# Patient Record
Sex: MALE | Race: WHITE | NOT HISPANIC OR LATINO | Employment: OTHER | URBAN - NONMETROPOLITAN AREA
[De-identification: names, ages, dates, MRNs, and addresses within clinical notes are randomized per-mention and may not be internally consistent; named-entity substitution may affect disease eponyms.]

---

## 2023-02-09 PROBLEM — R03.0 ELEVATED BLOOD PRESSURE READING: Status: ACTIVE | Noted: 2023-02-09

## 2023-02-09 PROBLEM — F17.210 CIGARETTE NICOTINE DEPENDENCE WITHOUT COMPLICATION: Status: ACTIVE | Noted: 2023-02-09

## 2023-02-09 PROBLEM — J20.9 ACUTE BRONCHITIS: Status: ACTIVE | Noted: 2023-02-09

## 2023-02-09 PROBLEM — L02.01 ABSCESS, CHEEK: Status: ACTIVE | Noted: 2023-02-09

## 2023-02-09 PROBLEM — J45.40 MODERATE PERSISTENT ASTHMA WITHOUT COMPLICATION (HHS-HCC): Status: ACTIVE | Noted: 2023-02-09

## 2023-02-09 PROBLEM — R79.9 ABNORMAL BLOOD CHEMISTRY: Status: ACTIVE | Noted: 2023-02-09

## 2023-02-09 PROBLEM — E78.5 HYPERLIPIDEMIA: Status: ACTIVE | Noted: 2023-02-09

## 2023-02-09 PROBLEM — N13.9: Status: ACTIVE | Noted: 2023-02-09

## 2023-02-09 PROBLEM — R03.0 ELEVATED BLOOD PRESSURE READING WITHOUT DIAGNOSIS OF HYPERTENSION: Status: ACTIVE | Noted: 2023-02-09

## 2023-02-09 PROBLEM — R21 SKIN RASH: Status: ACTIVE | Noted: 2023-02-09

## 2023-02-09 PROBLEM — H61.20 CERUMEN IMPACTION: Status: ACTIVE | Noted: 2023-02-09

## 2023-02-09 PROBLEM — L03.211 DIFFUSE CELLULITIS OF FACE: Status: ACTIVE | Noted: 2023-02-09

## 2023-02-09 PROBLEM — J45.909 ASTHMA (HHS-HCC): Status: ACTIVE | Noted: 2023-02-09

## 2023-02-09 PROBLEM — I10 HTN (HYPERTENSION): Status: ACTIVE | Noted: 2023-02-09

## 2023-02-09 RX ORDER — MONTELUKAST SODIUM 10 MG/1
10 TABLET ORAL DAILY
COMMUNITY
Start: 2019-07-29 | End: 2023-06-22

## 2023-02-09 RX ORDER — ATORVASTATIN CALCIUM 40 MG/1
40 TABLET, FILM COATED ORAL DAILY
COMMUNITY
Start: 2019-07-30 | End: 2023-10-05

## 2023-02-09 RX ORDER — LOSARTAN POTASSIUM 25 MG/1
25 TABLET ORAL DAILY
COMMUNITY
Start: 2021-09-13 | End: 2023-06-22

## 2023-02-09 RX ORDER — BUDESONIDE AND FORMOTEROL FUMARATE DIHYDRATE 160; 4.5 UG/1; UG/1
2 AEROSOL RESPIRATORY (INHALATION) 2 TIMES DAILY
COMMUNITY
Start: 2019-07-29

## 2023-02-09 RX ORDER — LATANOPROST 50 UG/ML
1 SOLUTION/ DROPS OPHTHALMIC NIGHTLY
COMMUNITY
Start: 2021-09-03

## 2023-02-09 RX ORDER — ALBUTEROL SULFATE 90 UG/1
1 AEROSOL, METERED RESPIRATORY (INHALATION) EVERY 4 HOURS PRN
COMMUNITY
Start: 2018-10-30

## 2023-03-22 NOTE — PROGRESS NOTES
"Subjective   Patient ID: Sami Greenwood is a 58 y.o. male who presents for Follow-up.  HPI  A 58 year White M presenting for followup:    He is feeling sleepy, he has been sleeping during the day and staying up at night.      B/L ear congestion: Ears have been feeling good recently. He has good days and bad days.      HLD: On atorvastatin, no SE's.     Asthma: He uses the asmanex, with the ventolin as needed. Needing it 3 or 4 times per week, staying in a lot which helps with the breathing.     HTN: On losartan, no SE's.      Sami is 7th out of 13 kids. His sister who comes is the 13th. She says that her mom got an IUD placed incorrectly and she got pregnant with twins, the girl twin recently had a stroke and Isabella is taking full time care of her right now.      All other systems have been reviewed and are negative for complaint     Review of Systems    Objective   Physical Exam  Gen: No acute distress. Alert and oriented x3.   HEENT: Normocephalic, atraumatic. PERRLA and EOMI, no conjunctival injection. B/L EAC are clear, TM's viewed are WNL. No rhinorrhea, no oropharyngeal lesions.  Neck: No lymphadenopathy, thyroid WNL.  CV: Regular rate and rhythm. Normal S1/S2.  Resp: CTAB/L. No wheezes or rhonchi appreciated.  Abdomen: Soft. Nontender. Nondistended. Bowel sounds normoactive. No guarding or rigidity.  Derm: Skin is warm and dry. No rashes appreciated or suspicious lesions noted.   Neuro: Cranial nerves intact. Normal gait.  Psych: Appropriate mood and affect. Normal speech and eye contact.   Extremities: No deformities appreciated. No severe edema.    Assessment/Plan        57yo male here for a followup visit:     #HTN:  Controlled on losartan     #HLD:  on atorvastatin, no SE\"s.      #Asthma:  Controlled on albuterol prn and symbicort  Rx sent singulair, he can use seasonally     #Elevated glucose:  Monitoring     HCM:  FIT test 2019 normal, next due 2022   Declining flu shot, COVID shot   "

## 2023-03-23 ENCOUNTER — OFFICE VISIT (OUTPATIENT)
Dept: PRIMARY CARE | Facility: CLINIC | Age: 58
End: 2023-03-23
Payer: MEDICARE

## 2023-03-23 VITALS
WEIGHT: 180 LBS | DIASTOLIC BLOOD PRESSURE: 61 MMHG | HEIGHT: 71 IN | HEART RATE: 69 BPM | BODY MASS INDEX: 25.2 KG/M2 | SYSTOLIC BLOOD PRESSURE: 121 MMHG

## 2023-03-23 DIAGNOSIS — F51.01 PRIMARY INSOMNIA: ICD-10-CM

## 2023-03-23 DIAGNOSIS — Z13.220 SCREENING FOR HYPERLIPIDEMIA: ICD-10-CM

## 2023-03-23 DIAGNOSIS — Z12.11 ENCOUNTER FOR SCREENING FOR MALIGNANT NEOPLASM OF COLON: ICD-10-CM

## 2023-03-23 DIAGNOSIS — Z00.00 HEALTHCARE MAINTENANCE: Primary | ICD-10-CM

## 2023-03-23 PROCEDURE — 99214 OFFICE O/P EST MOD 30 MIN: CPT | Performed by: FAMILY MEDICINE

## 2023-03-23 PROCEDURE — 3078F DIAST BP <80 MM HG: CPT | Performed by: FAMILY MEDICINE

## 2023-03-23 PROCEDURE — 3074F SYST BP LT 130 MM HG: CPT | Performed by: FAMILY MEDICINE

## 2023-04-07 LAB — NONINV COLON CA DNA+OCC BLD SCRN STL QL: POSITIVE

## 2023-04-17 ENCOUNTER — TELEPHONE (OUTPATIENT)
Dept: PRIMARY CARE | Facility: CLINIC | Age: 58
End: 2023-04-17
Payer: MEDICARE

## 2023-04-17 NOTE — TELEPHONE ENCOUNTER
Sami's wife called, she is wondering if we can send referral for a colonscopy? After Sami took some time to think about it he would like to have one done. They are wondering if there is anything else he needs to do before hand?

## 2023-04-18 DIAGNOSIS — Z12.11 ENCOUNTER FOR SCREENING FOR MALIGNANT NEOPLASM OF COLON: Primary | ICD-10-CM

## 2023-04-18 NOTE — TELEPHONE ENCOUNTER
Okay! I would recommend Dr. Bell for him please. I'll put the referral in for them. There is not anything really that needs to be done prior to the procedure, Dr. Bell is great and he will go over everything with them.

## 2023-06-22 DIAGNOSIS — I10 ESSENTIAL (PRIMARY) HYPERTENSION: ICD-10-CM

## 2023-06-22 DIAGNOSIS — J45.909 UNSPECIFIED ASTHMA, UNCOMPLICATED (HHS-HCC): ICD-10-CM

## 2023-06-22 RX ORDER — MONTELUKAST SODIUM 10 MG/1
TABLET ORAL
Qty: 90 TABLET | Refills: 1 | Status: SHIPPED | OUTPATIENT
Start: 2023-06-22 | End: 2023-10-30

## 2023-06-22 RX ORDER — LOSARTAN POTASSIUM 25 MG/1
TABLET ORAL
Qty: 90 TABLET | Refills: 1 | Status: SHIPPED | OUTPATIENT
Start: 2023-06-22 | End: 2023-10-26

## 2023-09-18 ENCOUNTER — LAB (OUTPATIENT)
Dept: LAB | Facility: LAB | Age: 58
End: 2023-09-18
Payer: MEDICARE

## 2023-09-18 DIAGNOSIS — F51.01 PRIMARY INSOMNIA: ICD-10-CM

## 2023-09-18 DIAGNOSIS — Z00.00 HEALTHCARE MAINTENANCE: ICD-10-CM

## 2023-09-18 LAB
ALANINE AMINOTRANSFERASE (SGPT) (U/L) IN SER/PLAS: 24 U/L (ref 10–52)
ALBUMIN (G/DL) IN SER/PLAS: 4.1 G/DL (ref 3.4–5)
ALKALINE PHOSPHATASE (U/L) IN SER/PLAS: 95 U/L (ref 33–120)
ANION GAP IN SER/PLAS: 11 MMOL/L (ref 10–20)
ASPARTATE AMINOTRANSFERASE (SGOT) (U/L) IN SER/PLAS: 13 U/L (ref 9–39)
BASOPHILS (10*3/UL) IN BLOOD BY AUTOMATED COUNT: 0.09 X10E9/L (ref 0–0.1)
BASOPHILS/100 LEUKOCYTES IN BLOOD BY AUTOMATED COUNT: 1.1 % (ref 0–2)
BILIRUBIN TOTAL (MG/DL) IN SER/PLAS: 0.7 MG/DL (ref 0–1.2)
CALCIUM (MG/DL) IN SER/PLAS: 9.4 MG/DL (ref 8.6–10.3)
CARBON DIOXIDE, TOTAL (MMOL/L) IN SER/PLAS: 30 MMOL/L (ref 21–32)
CHLORIDE (MMOL/L) IN SER/PLAS: 103 MMOL/L (ref 98–107)
CREATININE (MG/DL) IN SER/PLAS: 1.08 MG/DL (ref 0.5–1.3)
EOSINOPHILS (10*3/UL) IN BLOOD BY AUTOMATED COUNT: 0.46 X10E9/L (ref 0–0.7)
EOSINOPHILS/100 LEUKOCYTES IN BLOOD BY AUTOMATED COUNT: 5.5 % (ref 0–6)
ERYTHROCYTE DISTRIBUTION WIDTH (RATIO) BY AUTOMATED COUNT: 12.6 % (ref 11.5–14.5)
ERYTHROCYTE MEAN CORPUSCULAR HEMOGLOBIN CONCENTRATION (G/DL) BY AUTOMATED: 32.3 G/DL (ref 32–36)
ERYTHROCYTE MEAN CORPUSCULAR VOLUME (FL) BY AUTOMATED COUNT: 98 FL (ref 80–100)
ERYTHROCYTES (10*6/UL) IN BLOOD BY AUTOMATED COUNT: 4.94 X10E12/L (ref 4.5–5.9)
GFR MALE: 79 ML/MIN/1.73M2
GLUCOSE (MG/DL) IN SER/PLAS: 90 MG/DL (ref 74–99)
HEMATOCRIT (%) IN BLOOD BY AUTOMATED COUNT: 48.3 % (ref 41–52)
HEMOGLOBIN (G/DL) IN BLOOD: 15.6 G/DL (ref 13.5–17.5)
IMMATURE GRANULOCYTES/100 LEUKOCYTES IN BLOOD BY AUTOMATED COUNT: 0.1 % (ref 0–0.9)
LEUKOCYTES (10*3/UL) IN BLOOD BY AUTOMATED COUNT: 8.3 X10E9/L (ref 4.4–11.3)
LYMPHOCYTES (10*3/UL) IN BLOOD BY AUTOMATED COUNT: 2.92 X10E9/L (ref 1.2–4.8)
LYMPHOCYTES/100 LEUKOCYTES IN BLOOD BY AUTOMATED COUNT: 35.1 % (ref 13–44)
MONOCYTES (10*3/UL) IN BLOOD BY AUTOMATED COUNT: 0.65 X10E9/L (ref 0.1–1)
MONOCYTES/100 LEUKOCYTES IN BLOOD BY AUTOMATED COUNT: 7.8 % (ref 2–10)
NEUTROPHILS (10*3/UL) IN BLOOD BY AUTOMATED COUNT: 4.18 X10E9/L (ref 1.2–7.7)
NEUTROPHILS/100 LEUKOCYTES IN BLOOD BY AUTOMATED COUNT: 50.4 % (ref 40–80)
PLATELETS (10*3/UL) IN BLOOD AUTOMATED COUNT: 329 X10E9/L (ref 150–450)
POTASSIUM (MMOL/L) IN SER/PLAS: 4.6 MMOL/L (ref 3.5–5.3)
PROTEIN TOTAL: 6.3 G/DL (ref 6.4–8.2)
SODIUM (MMOL/L) IN SER/PLAS: 139 MMOL/L (ref 136–145)
UREA NITROGEN (MG/DL) IN SER/PLAS: 14 MG/DL (ref 6–23)

## 2023-09-18 PROCEDURE — 36415 COLL VENOUS BLD VENIPUNCTURE: CPT

## 2023-09-18 PROCEDURE — 80053 COMPREHEN METABOLIC PANEL: CPT

## 2023-09-18 PROCEDURE — 85025 COMPLETE CBC W/AUTO DIFF WBC: CPT

## 2023-09-19 ENCOUNTER — LAB (OUTPATIENT)
Dept: LAB | Facility: LAB | Age: 58
End: 2023-09-19
Payer: MEDICARE

## 2023-09-19 DIAGNOSIS — Z13.220 SCREENING FOR HYPERLIPIDEMIA: ICD-10-CM

## 2023-09-19 LAB
CHOLESTEROL (MG/DL) IN SER/PLAS: 125 MG/DL (ref 0–199)
CHOLESTEROL IN HDL (MG/DL) IN SER/PLAS: 30.1 MG/DL
CHOLESTEROL/HDL RATIO: 4.2
LDL: 80 MG/DL (ref 0–99)
TRIGLYCERIDE (MG/DL) IN SER/PLAS: 77 MG/DL (ref 0–149)
VLDL: 15 MG/DL (ref 0–40)

## 2023-09-19 PROCEDURE — 80061 LIPID PANEL: CPT

## 2023-09-19 PROCEDURE — 36415 COLL VENOUS BLD VENIPUNCTURE: CPT

## 2023-09-20 NOTE — PROGRESS NOTES
"Sami Greenwood is a 58 y.o. male who presents for Medicare Annual Wellness Visit Subsequent (6 months)     B/L ear congestion: Ears have been feeling good recently. He has good days and bad days.      HLD: On atorvastatin, no SE's.     Asthma: He uses the asmanex, with the ventolin as needed. Needing it 3 or 4 times per week, staying in a lot which helps with the breathing.     HTN: On losartan, no SE's.      Sami is 7th out of 13 kids. His sister who comes is the 13th. She says that her mom got an IUD placed incorrectly and she got pregnant with twins, the girl twin recently had a stroke and Isabella is taking full time care of her right now.      All other systems have been reviewed and are negative for complaint     Objective   /70 (BP Location: Left arm, Patient Position: Sitting, BP Cuff Size: Adult)   Pulse 69   Ht 1.803 m (5' 11\")   Wt 78.9 kg (174 lb)   BMI 24.27 kg/m²     Gen: No acute distress, alert and oriented x3, pleasant   HEENT: moist mucous membranes, b/l external auditory canals are clear of debris, TMs within normal limits, no oropharyngeal lesions, eomi, perrla   Neck: thyroid within normal limits, no lymphadenopathy   CV: RRR, normal S1/S2, no murmur   Resp: Clear to auscultation bilaterally, no wheezes or rhonchi appreciated  Abd: soft, nontender, non-distended, no guarding/rigidity, bowel sounds present  Extr: no edema, no calf tenderness  Derm: Skin is warm and dry, no rashes appreciated  Psych: mood is good, affect is congruent, good hygiene, normal speech and eye contact  Neuro: cranial nerves grossly intact, normal gait    Assessment/Plan       #HTN:  Controlled on losartan     #HLD:  on atorvastatin, no SE\"s.      #Asthma:  Controlled on albuterol prn and symbicort  Rx sent singulair, he can use seasonally     #Elevated glucose:  Diet controlled  Monitoring     HCM:  Cologuard positive march, scheduled for C-scope in october  Declining flu shot, COVID shot   "

## 2023-09-28 ENCOUNTER — OFFICE VISIT (OUTPATIENT)
Dept: PRIMARY CARE | Facility: CLINIC | Age: 58
End: 2023-09-28
Payer: MEDICARE

## 2023-09-28 VITALS
WEIGHT: 174 LBS | BODY MASS INDEX: 24.36 KG/M2 | HEIGHT: 71 IN | HEART RATE: 69 BPM | DIASTOLIC BLOOD PRESSURE: 70 MMHG | SYSTOLIC BLOOD PRESSURE: 115 MMHG

## 2023-09-28 DIAGNOSIS — E78.5 HYPERLIPIDEMIA, UNSPECIFIED HYPERLIPIDEMIA TYPE: Primary | ICD-10-CM

## 2023-09-28 PROBLEM — N13.9: Status: RESOLVED | Noted: 2023-02-09 | Resolved: 2023-09-28

## 2023-09-28 PROBLEM — R79.9 ABNORMAL BLOOD CHEMISTRY: Status: RESOLVED | Noted: 2023-02-09 | Resolved: 2023-09-28

## 2023-09-28 PROBLEM — L03.211 DIFFUSE CELLULITIS OF FACE: Status: RESOLVED | Noted: 2023-02-09 | Resolved: 2023-09-28

## 2023-09-28 PROBLEM — L71.9 ROSACEA, UNSPECIFIED: Status: ACTIVE | Noted: 2017-12-22

## 2023-09-28 PROBLEM — L02.01 ABSCESS, CHEEK: Status: RESOLVED | Noted: 2023-02-09 | Resolved: 2023-09-28

## 2023-09-28 PROBLEM — J20.9 ACUTE BRONCHITIS: Status: RESOLVED | Noted: 2023-02-09 | Resolved: 2023-09-28

## 2023-09-28 PROBLEM — R21 SKIN RASH: Status: RESOLVED | Noted: 2023-02-09 | Resolved: 2023-09-28

## 2023-09-28 PROBLEM — H40.033 ANATOMICAL NARROW ANGLE, BILATERAL: Status: ACTIVE | Noted: 2017-05-04

## 2023-09-28 PROBLEM — H61.20 CERUMEN IMPACTION: Status: RESOLVED | Noted: 2023-02-09 | Resolved: 2023-09-28

## 2023-09-28 PROCEDURE — G0439 PPPS, SUBSEQ VISIT: HCPCS | Performed by: FAMILY MEDICINE

## 2023-09-28 PROCEDURE — 3078F DIAST BP <80 MM HG: CPT | Performed by: FAMILY MEDICINE

## 2023-09-28 PROCEDURE — 3074F SYST BP LT 130 MM HG: CPT | Performed by: FAMILY MEDICINE

## 2023-09-28 ASSESSMENT — ACTIVITIES OF DAILY LIVING (ADL)
DRESSING: INDEPENDENT
DOING_HOUSEWORK: NEEDS ASSISTANCE
TAKING_MEDICATION: NEEDS ASSISTANCE
BATHING: INDEPENDENT
MANAGING_FINANCES: NEEDS ASSISTANCE
GROCERY_SHOPPING: NEEDS ASSISTANCE

## 2023-09-28 ASSESSMENT — PATIENT HEALTH QUESTIONNAIRE - PHQ9
2. FEELING DOWN, DEPRESSED OR HOPELESS: NOT AT ALL
SUM OF ALL RESPONSES TO PHQ9 QUESTIONS 1 AND 2: 0
1. LITTLE INTEREST OR PLEASURE IN DOING THINGS: NOT AT ALL

## 2023-10-05 DIAGNOSIS — E78.5 HYPERLIPIDEMIA, UNSPECIFIED: ICD-10-CM

## 2023-10-05 RX ORDER — ATORVASTATIN CALCIUM 40 MG/1
40 TABLET, FILM COATED ORAL DAILY
Qty: 90 TABLET | Refills: 3 | Status: SHIPPED | OUTPATIENT
Start: 2023-10-05

## 2023-10-26 DIAGNOSIS — I10 ESSENTIAL (PRIMARY) HYPERTENSION: ICD-10-CM

## 2023-10-26 RX ORDER — LOSARTAN POTASSIUM 25 MG/1
TABLET ORAL
Qty: 90 TABLET | Refills: 1 | Status: SHIPPED | OUTPATIENT
Start: 2023-10-26 | End: 2024-04-26 | Stop reason: SDUPTHER

## 2023-10-30 DIAGNOSIS — J45.909 UNSPECIFIED ASTHMA, UNCOMPLICATED (HHS-HCC): ICD-10-CM

## 2023-10-30 RX ORDER — MONTELUKAST SODIUM 10 MG/1
TABLET ORAL
Qty: 90 TABLET | Refills: 1 | Status: SHIPPED | OUTPATIENT
Start: 2023-10-30 | End: 2024-04-26 | Stop reason: SDUPTHER

## 2023-12-18 ENCOUNTER — OFFICE VISIT (OUTPATIENT)
Dept: PRIMARY CARE | Facility: CLINIC | Age: 58
End: 2023-12-18
Payer: MEDICARE

## 2023-12-18 VITALS
WEIGHT: 171 LBS | HEIGHT: 71 IN | DIASTOLIC BLOOD PRESSURE: 70 MMHG | SYSTOLIC BLOOD PRESSURE: 123 MMHG | TEMPERATURE: 98.5 F | BODY MASS INDEX: 23.94 KG/M2 | HEART RATE: 68 BPM

## 2023-12-18 DIAGNOSIS — R05.1 ACUTE COUGH: ICD-10-CM

## 2023-12-18 LAB
POC RAPID INFLUENZA A: NEGATIVE
POC RAPID INFLUENZA B: NEGATIVE
POC SARS-COV-2 AG BINAX: NORMAL

## 2023-12-18 PROCEDURE — 3078F DIAST BP <80 MM HG: CPT | Performed by: FAMILY MEDICINE

## 2023-12-18 PROCEDURE — 99214 OFFICE O/P EST MOD 30 MIN: CPT | Performed by: FAMILY MEDICINE

## 2023-12-18 PROCEDURE — 87811 SARS-COV-2 COVID19 W/OPTIC: CPT | Performed by: FAMILY MEDICINE

## 2023-12-18 PROCEDURE — 3074F SYST BP LT 130 MM HG: CPT | Performed by: FAMILY MEDICINE

## 2023-12-18 PROCEDURE — 87804 INFLUENZA ASSAY W/OPTIC: CPT | Performed by: FAMILY MEDICINE

## 2023-12-18 RX ORDER — SOD SULF/POT CHLORIDE/MAG SULF 1.479 G
TABLET ORAL
COMMUNITY
Start: 2023-11-01

## 2023-12-18 RX ORDER — PREDNISONE 20 MG/1
40 TABLET ORAL DAILY
Qty: 10 TABLET | Refills: 0 | Status: SHIPPED | OUTPATIENT
Start: 2023-12-18 | End: 2023-12-23

## 2023-12-18 RX ORDER — AZITHROMYCIN 250 MG/1
TABLET, FILM COATED ORAL
Qty: 6 TABLET | Refills: 0 | Status: SHIPPED | OUTPATIENT
Start: 2023-12-18 | End: 2023-12-23

## 2023-12-18 NOTE — PROGRESS NOTES
"Sami Greenwood is a 58 y.o. male who presents for Sick Visit (Cough for about a week, taking Dimetapp which is helping a little)    Cough, started about a week ago. Non-productive. No sore throat or rhinorrhea. Ears are a bit muffled. No vomiting or diarrhea. No sinus pressure.     B/L ear congestion: Ears have been feeling good recently. He has good days and bad days.      HLD: On atorvastatin, no SE's.     Asthma: He uses the asmanex, with the ventolin as needed. Needing it 3 or 4 times per week, staying in a lot which helps with the breathing.     HTN: On losartan, no SE's.      Sami is 7th out of 13 kids. His sister who comes is the 13th. She says that her mom got an IUD placed incorrectly and she got pregnant with twins, the girl twin recently had a stroke and Isabella is taking full time care of her right now.      All other systems have been reviewed and are negative for complaint     Objective   /70 (BP Location: Left arm, Patient Position: Sitting, BP Cuff Size: Adult)   Pulse 68   Temp 36.9 °C (98.5 °F)   Ht 1.803 m (5' 11\")   Wt 77.6 kg (171 lb)   BMI 23.85 kg/m²     Gen: No acute distress, alert and oriented x3, pleasant   HEENT: moist mucous membranes, b/l external auditory canals are clear of debris, TMs within normal limits, no oropharyngeal lesions, eomi, perrla   Neck: thyroid within normal limits, no lymphadenopathy   CV: RRR, normal S1/S2, no murmur   Resp: Clear to auscultation bilaterally, no wheezes or rhonchi appreciated  Abd: soft, nontender, non-distended, no guarding/rigidity, bowel sounds present  Extr: no edema, no calf tenderness  Derm: Skin is warm and dry, no rashes appreciated  Psych: mood is good, affect is congruent, good hygiene, normal speech and eye contact  Neuro: cranial nerves grossly intact, normal gait    Assessment/Plan     #Acute bronchitis  Neg flu and COVID rapid in office  Rx sent azithromycin  Rx sent prednisone burst  Fluids, rest, tylenol " "prn    #HTN:  Controlled on losartan     #HLD:  on atorvastatin, no SE\"s.      #Asthma:  Controlled on albuterol prn and symbicort  Rx sent singulair, he can use seasonally     #Elevated glucose:  Diet controlled  Monitoring     HCM:  Cologjuan positive march, scheduled for C-scope in october  Declining flu shot, COVID shot   "

## 2024-04-26 DIAGNOSIS — J45.909 UNSPECIFIED ASTHMA, UNCOMPLICATED (HHS-HCC): ICD-10-CM

## 2024-04-26 DIAGNOSIS — I10 ESSENTIAL (PRIMARY) HYPERTENSION: ICD-10-CM

## 2024-04-26 RX ORDER — LOSARTAN POTASSIUM 25 MG/1
25 TABLET ORAL DAILY
Qty: 90 TABLET | Refills: 3 | Status: SHIPPED | OUTPATIENT
Start: 2024-04-26

## 2024-04-26 RX ORDER — MONTELUKAST SODIUM 10 MG/1
10 TABLET ORAL DAILY
Qty: 90 TABLET | Refills: 3 | Status: SHIPPED | OUTPATIENT
Start: 2024-04-26

## 2024-06-24 NOTE — PROGRESS NOTES
"Subjective   Patient ID: Sami Greenwood is a 59 y.o. male who presents for Medicare Annual Wellness Visit Subsequent (Follow up, no concerns).     B/L ear congestion: Ears have been feeling good recently. He has good days and bad days.      HLD: On atorvastatin, no SE's.     Asthma: He uses the asmanex, with the ventolin as needed. Needing it 3 or 4 times per week, staying in a lot which helps with the breathing.     HTN: On losartan, no SE's.      Sami is 7th out of 13 kids. His sister who comes is the 13th. She says that her mom got an IUD placed incorrectly and she got pregnant with twins, the girl twin recently had a stroke and Isabella is taking full time care of her right now.      All other systems have been reviewed and are negative for complaint     Objective   /65 (BP Location: Left arm, Patient Position: Sitting, BP Cuff Size: Adult)   Pulse 72   Ht 1.803 m (5' 11\")   Wt 74.4 kg (164 lb)   BMI 22.87 kg/m²     Gen: No acute distress, alert and oriented x3, pleasant   HEENT: moist mucous membranes, b/l external auditory canals are clear of debris, TMs within normal limits, no oropharyngeal lesions, eomi, perrla   Neck: thyroid within normal limits, no lymphadenopathy   CV: RRR, normal S1/S2, no murmur   Resp: Clear to auscultation bilaterally, no wheezes or rhonchi appreciated  Abd: soft, nontender, non-distended, no guarding/rigidity, bowel sounds present  Extr: no edema, no calf tenderness  Derm: Skin is warm and dry, no rashes appreciated  Psych: mood is good, affect is congruent, good hygiene, normal speech and eye contact  Neuro: cranial nerves grossly intact, normal gait    Assessment/Plan      #HTN:  Controlled on losartan    #Cerumen impaction  Tolerated curettage  Patient ID: Sami Greenwood is a 59 y.o. male.    Ear Cerumen Removal    Date/Time: 7/3/2024 11:25 AM    Performed by: Alysa Pardo DO  Authorized by: Alysa Pardo DO    Consent:     Consent obtained:  " "Verbal  Procedure details:     Location:  R ear    Procedure type: curette      Procedure outcomes: cerumen removed    Post-procedure details:     Inspection:  TM intact and ear canal clear    Hearing quality:  Improved    Procedure completion:  Tolerated well, no immediate complications       #HLD:  on atorvastatin, no SE\"s.      #Asthma:  Controlled on albuterol prn and symbicort  Rx sent singulair, he can use seasonally     #Elevated glucose:  Diet controlled  Monitoring     HCM:  Ave positive march, scheduled for C-scope in october  Declining flu shot, COVID shot   [facility] AWV 9/28/23  "

## 2024-07-03 ENCOUNTER — APPOINTMENT (OUTPATIENT)
Dept: PRIMARY CARE | Facility: CLINIC | Age: 59
End: 2024-07-03
Payer: MEDICARE

## 2024-07-03 VITALS
BODY MASS INDEX: 22.96 KG/M2 | HEIGHT: 71 IN | DIASTOLIC BLOOD PRESSURE: 78 MMHG | HEART RATE: 72 BPM | WEIGHT: 164 LBS | SYSTOLIC BLOOD PRESSURE: 130 MMHG

## 2024-07-03 DIAGNOSIS — Z00.00 HEALTHCARE MAINTENANCE: Primary | ICD-10-CM

## 2024-07-03 DIAGNOSIS — Z13.220 SCREENING FOR HYPERLIPIDEMIA: ICD-10-CM

## 2024-07-03 DIAGNOSIS — I10 PRIMARY HYPERTENSION: ICD-10-CM

## 2024-07-03 DIAGNOSIS — H61.21 IMPACTED CERUMEN OF RIGHT EAR: ICD-10-CM

## 2024-07-03 PROCEDURE — 3075F SYST BP GE 130 - 139MM HG: CPT | Performed by: FAMILY MEDICINE

## 2024-07-03 PROCEDURE — G0439 PPPS, SUBSEQ VISIT: HCPCS | Performed by: FAMILY MEDICINE

## 2024-07-03 PROCEDURE — 69210 REMOVE IMPACTED EAR WAX UNI: CPT | Performed by: FAMILY MEDICINE

## 2024-07-03 PROCEDURE — 3078F DIAST BP <80 MM HG: CPT | Performed by: FAMILY MEDICINE

## 2024-07-03 ASSESSMENT — ACTIVITIES OF DAILY LIVING (ADL)
DRESSING: INDEPENDENT
DOING_HOUSEWORK: INDEPENDENT
BATHING: INDEPENDENT
GROCERY_SHOPPING: NEEDS ASSISTANCE
TAKING_MEDICATION: NEEDS ASSISTANCE
MANAGING_FINANCES: NEEDS ASSISTANCE

## 2024-07-03 ASSESSMENT — PATIENT HEALTH QUESTIONNAIRE - PHQ9
1. LITTLE INTEREST OR PLEASURE IN DOING THINGS: NOT AT ALL
SUM OF ALL RESPONSES TO PHQ9 QUESTIONS 1 AND 2: 0
2. FEELING DOWN, DEPRESSED OR HOPELESS: NOT AT ALL

## 2024-09-25 DIAGNOSIS — E78.5 HYPERLIPIDEMIA, UNSPECIFIED: ICD-10-CM

## 2024-09-25 RX ORDER — ATORVASTATIN CALCIUM 40 MG/1
40 TABLET, FILM COATED ORAL DAILY
Qty: 90 TABLET | Refills: 3 | Status: SHIPPED | OUTPATIENT
Start: 2024-09-25

## 2024-10-09 NOTE — PROGRESS NOTES
"Subjective     Patient ID: Sami Greenwood is a 59 y.o. male who presents for Sick Visit (Rash, red, round, scabbed from itching, on both arms).    HPI  Sami is here today for a rash on both his arms, wrist, and hands. He was outside doing yard work this past week and developed a rash three days ago. He describes the rash as red, itchy, and bumpy. He has tried benadryl gel and calamine lotion so far and it is not helping.       Current Outpatient Medications:     albuterol 90 mcg/actuation inhaler, Inhale 1 puff every 4 hours if needed (ASTHMA). MAY USE TWO PUFFS IF NEEDED, Disp: , Rfl:     atorvastatin (Lipitor) 40 mg tablet, TAKE 1 TABLET BY MOUTH EVERY DAY, Disp: 90 tablet, Rfl: 3    budesonide-formoteroL (Symbicort) 160-4.5 mcg/actuation inhaler, Inhale 2 puffs 2 times a day. RINSE MOUTH AFTER USE, Disp: , Rfl:     latanoprost (Xalatan) 0.005 % ophthalmic solution, Administer 1 drop into both eyes once daily at bedtime., Disp: , Rfl:     losartan (Cozaar) 25 mg tablet, Take 1 tablet (25 mg) by mouth once daily., Disp: 90 tablet, Rfl: 3    montelukast (Singulair) 10 mg tablet, Take 1 tablet (10 mg) by mouth once daily., Disp: 90 tablet, Rfl: 3    Sutab 1.479-0.188- 0.225 gram tablet, TAKE BY MOUTH AS DIRECTED, Disp: , Rfl:     Review of Systems    /72 (BP Location: Left arm, Patient Position: Sitting, BP Cuff Size: Adult)   Pulse 75   Ht 1.803 m (5' 11\")   Wt 78 kg (172 lb)   SpO2 98%   BMI 23.99 kg/m²      Objective     Physical Exam    Gen: No acute distress, alert and oriented x3, pleasant   Resp: Clear to auscultation bilaterally, no wheezes or rhonchi appreciated  Extr: Rash bilateral upper extremities. no edema, no calf tenderness  Derm: Scaly erythematous papules and plaques with edema and vesiculation in a distribution consistent with contact Bilateral upper extremities.  Psych: mood is good, affect is congruent, good hygiene, normal speech and eye contact    Assessment/Plan   #Contact " Dermatitis  -Poison Ivy  Triamcinolone ointment  Instructed to wash skin after yard work with dial, tigist, or lava soap.   -May use calamine lotion or benadryl gel in between applications of triamcinolone  -Cool water when washing  -Wash clothes he was wearing that day before wearing again.

## 2024-10-10 ENCOUNTER — OFFICE VISIT (OUTPATIENT)
Dept: PRIMARY CARE | Facility: CLINIC | Age: 59
End: 2024-10-10
Payer: MEDICARE

## 2024-10-10 VITALS
DIASTOLIC BLOOD PRESSURE: 72 MMHG | OXYGEN SATURATION: 98 % | HEART RATE: 75 BPM | BODY MASS INDEX: 24.08 KG/M2 | HEIGHT: 71 IN | SYSTOLIC BLOOD PRESSURE: 149 MMHG | WEIGHT: 172 LBS

## 2024-10-10 DIAGNOSIS — L23.7 ALLERGIC CONTACT DERMATITIS DUE TO PLANTS, EXCEPT FOOD: Primary | ICD-10-CM

## 2024-10-10 PROCEDURE — 3077F SYST BP >= 140 MM HG: CPT

## 2024-10-10 PROCEDURE — 3008F BODY MASS INDEX DOCD: CPT

## 2024-10-10 PROCEDURE — 3078F DIAST BP <80 MM HG: CPT

## 2024-10-10 PROCEDURE — 99213 OFFICE O/P EST LOW 20 MIN: CPT

## 2024-10-10 RX ORDER — TRIAMCINOLONE ACETONIDE 1 MG/G
OINTMENT TOPICAL 2 TIMES DAILY PRN
Qty: 15 G | Refills: 0 | Status: SHIPPED | OUTPATIENT
Start: 2024-10-10 | End: 2025-02-07

## 2024-12-30 DIAGNOSIS — E78.5 HYPERLIPIDEMIA, UNSPECIFIED: ICD-10-CM

## 2024-12-30 RX ORDER — ATORVASTATIN CALCIUM 40 MG/1
40 TABLET, FILM COATED ORAL DAILY
Qty: 90 TABLET | Refills: 3 | Status: SHIPPED | OUTPATIENT
Start: 2024-12-30

## 2025-01-08 ENCOUNTER — APPOINTMENT (OUTPATIENT)
Dept: PRIMARY CARE | Facility: CLINIC | Age: 60
End: 2025-01-08
Payer: MEDICARE

## 2025-01-17 ENCOUNTER — APPOINTMENT (OUTPATIENT)
Dept: PRIMARY CARE | Facility: CLINIC | Age: 60
End: 2025-01-17
Payer: MEDICARE

## 2025-02-27 ENCOUNTER — TELEPHONE (OUTPATIENT)
Dept: PRIMARY CARE | Facility: CLINIC | Age: 60
End: 2025-02-27
Payer: MEDICARE

## 2025-02-27 DIAGNOSIS — J20.9 ACUTE BRONCHITIS, UNSPECIFIED ORGANISM: Primary | ICD-10-CM

## 2025-02-27 RX ORDER — AZITHROMYCIN 250 MG/1
TABLET, FILM COATED ORAL
Qty: 6 TABLET | Refills: 0 | Status: SHIPPED | OUTPATIENT
Start: 2025-02-27 | End: 2025-03-04

## 2025-06-06 ENCOUNTER — APPOINTMENT (OUTPATIENT)
Dept: PRIMARY CARE | Facility: CLINIC | Age: 60
End: 2025-06-06
Payer: MEDICARE

## 2025-06-24 NOTE — PROGRESS NOTES
"Subjective   Reason for Visit: Sami Greenwood is an 60 y.o. male here for a Medicare Wellness visit.      Reviewed all medications by prescribing practitioner or clinical pharmacist (such as prescriptions, OTCs, herbal therapies and supplements) and documented in the medical record.    HPI    Cloged ears-States he has to have wax removed about every 6 months. Having issues with hearing.     Asthma- albuterol, singular, symbicort. needs refill on inhaler. Still smoking. Does not plan on quitting.     HTN- Controlled on losartan     HDL-Has not had checked since 2023. Taking atorvastatin no SE's.       Patient Care Team:  Alysa Pardo DO as PCP - General  Alysa Pardo DO as PCP - [employer] PCP     Objective     /64 (BP Location: Right arm, Patient Position: Sitting, BP Cuff Size: Adult)   Pulse 76   Ht 1.803 m (5' 11\")   Wt 74.8 kg (165 lb)   BMI 23.01 kg/m²       Physical Exam  Vitals reviewed.   HENT:      Right Ear: There is impacted cerumen.      Left Ear: Tympanic membrane and ear canal normal.      Nose: Nose normal.      Mouth/Throat:      Mouth: Mucous membranes are moist.   Eyes:      Pupils: Pupils are equal, round, and reactive to light.   Cardiovascular:      Rate and Rhythm: Normal rate.      Heart sounds: Normal heart sounds.   Pulmonary:      Effort: Pulmonary effort is normal.      Breath sounds: Normal breath sounds.   Abdominal:      General: Bowel sounds are normal.   Musculoskeletal:         General: Normal range of motion.      Cervical back: Normal range of motion and neck supple.   Skin:     General: Skin is warm.      Capillary Refill: Capillary refill takes less than 2 seconds.   Neurological:      General: No focal deficit present.      Mental Status: He is alert and oriented to person, place, and time.   Psychiatric:         Mood and Affect: Mood normal.       Assessment & Plan       #HTN:  Controlled on losartan     #Cerumen impaction  Tolerated " "curettage    #HLD:  on atorvastatin, no SE\"s.   Lipid panel ordered     #Asthma:  Controlled on albuterol prn and symbicort     #Elevated glucose:  Diet controlled  Monitoring    HCM:   C-scope in 2023  Declining flu shot, COVID shot   [facility] AWV 6/27/25  CT lung ordered  PSA ordered       "

## 2025-06-27 ENCOUNTER — APPOINTMENT (OUTPATIENT)
Dept: PRIMARY CARE | Facility: CLINIC | Age: 60
End: 2025-06-27
Payer: MEDICARE

## 2025-06-27 VITALS
SYSTOLIC BLOOD PRESSURE: 115 MMHG | BODY MASS INDEX: 23.1 KG/M2 | DIASTOLIC BLOOD PRESSURE: 64 MMHG | WEIGHT: 165 LBS | HEIGHT: 71 IN | HEART RATE: 76 BPM

## 2025-06-27 DIAGNOSIS — Z87.891 PERSONAL HISTORY OF TOBACCO USE, PRESENTING HAZARDS TO HEALTH: ICD-10-CM

## 2025-06-27 DIAGNOSIS — H61.21 IMPACTED CERUMEN OF RIGHT EAR: ICD-10-CM

## 2025-06-27 DIAGNOSIS — I15.9 SECONDARY HYPERTENSION: ICD-10-CM

## 2025-06-27 DIAGNOSIS — Z13.29 THYROID DISORDER SCREENING: ICD-10-CM

## 2025-06-27 DIAGNOSIS — Z12.5 PROSTATE CANCER SCREENING: ICD-10-CM

## 2025-06-27 DIAGNOSIS — J45.20 MILD INTERMITTENT ASTHMA, UNSPECIFIED WHETHER COMPLICATED (HHS-HCC): ICD-10-CM

## 2025-06-27 DIAGNOSIS — Z00.00 ROUTINE GENERAL MEDICAL EXAMINATION AT HEALTH CARE FACILITY: Primary | ICD-10-CM

## 2025-06-27 DIAGNOSIS — E78.5 HYPERLIPIDEMIA, UNSPECIFIED HYPERLIPIDEMIA TYPE: ICD-10-CM

## 2025-06-27 DIAGNOSIS — F17.210 CIGARETTE NICOTINE DEPENDENCE WITHOUT COMPLICATION: ICD-10-CM

## 2025-06-27 PROCEDURE — 3008F BODY MASS INDEX DOCD: CPT

## 2025-06-27 PROCEDURE — G0439 PPPS, SUBSEQ VISIT: HCPCS

## 2025-06-27 PROCEDURE — 3078F DIAST BP <80 MM HG: CPT

## 2025-06-27 PROCEDURE — 99406 BEHAV CHNG SMOKING 3-10 MIN: CPT

## 2025-06-27 PROCEDURE — 99396 PREV VISIT EST AGE 40-64: CPT

## 2025-06-27 PROCEDURE — 3074F SYST BP LT 130 MM HG: CPT

## 2025-06-27 ASSESSMENT — PATIENT HEALTH QUESTIONNAIRE - PHQ9
2. FEELING DOWN, DEPRESSED OR HOPELESS: NOT AT ALL
1. LITTLE INTEREST OR PLEASURE IN DOING THINGS: NOT AT ALL
SUM OF ALL RESPONSES TO PHQ9 QUESTIONS 1 AND 2: 0
2. FEELING DOWN, DEPRESSED OR HOPELESS: NOT AT ALL
SUM OF ALL RESPONSES TO PHQ9 QUESTIONS 1 AND 2: 0
1. LITTLE INTEREST OR PLEASURE IN DOING THINGS: NOT AT ALL

## 2025-06-27 ASSESSMENT — ACTIVITIES OF DAILY LIVING (ADL)
GROCERY_SHOPPING: TOTAL CARE
DOING_HOUSEWORK: INDEPENDENT
TAKING_MEDICATION: INDEPENDENT
BATHING: INDEPENDENT
MANAGING_FINANCES: TOTAL CARE
DRESSING: INDEPENDENT

## 2025-06-27 NOTE — PATIENT INSTRUCTIONS
[facility]:  Schedule an appointment for labs at [facility]/patient or call [phone] 24 hours a day, 7 days a week.   You can also download the [facility] lab scheduling curt on your phone.     Radiology [phone]    Cardiology testing (ECHO, Stress Test, ZioPatch):  [phone]    Pulmonary Function Test:  [phone]

## 2025-07-19 DIAGNOSIS — J45.909 UNSPECIFIED ASTHMA, UNCOMPLICATED (HHS-HCC): ICD-10-CM

## 2025-07-19 DIAGNOSIS — I10 ESSENTIAL (PRIMARY) HYPERTENSION: ICD-10-CM

## 2025-07-21 RX ORDER — MONTELUKAST SODIUM 10 MG/1
10 TABLET ORAL DAILY
Qty: 90 TABLET | Refills: 3 | Status: SHIPPED | OUTPATIENT
Start: 2025-07-21

## 2025-07-21 RX ORDER — LOSARTAN POTASSIUM 25 MG/1
25 TABLET ORAL DAILY
Qty: 90 TABLET | Refills: 3 | Status: SHIPPED | OUTPATIENT
Start: 2025-07-21

## 2025-12-19 ENCOUNTER — APPOINTMENT (OUTPATIENT)
Dept: PRIMARY CARE | Facility: CLINIC | Age: 60
End: 2025-12-19
Payer: MEDICARE